# Patient Record
Sex: FEMALE | Race: BLACK OR AFRICAN AMERICAN | NOT HISPANIC OR LATINO | URBAN - METROPOLITAN AREA
[De-identification: names, ages, dates, MRNs, and addresses within clinical notes are randomized per-mention and may not be internally consistent; named-entity substitution may affect disease eponyms.]

---

## 2019-11-19 ENCOUNTER — HISTORICAL (OUTPATIENT)
Dept: ADMINISTRATIVE | Facility: HOSPITAL | Age: 69
End: 2019-11-19

## 2021-06-18 ENCOUNTER — HISTORICAL (OUTPATIENT)
Dept: ENDOSCOPY | Facility: HOSPITAL | Age: 71
End: 2021-06-18

## 2022-04-30 NOTE — OP NOTE
Patient:   Penny Sanabria            MRN: [id number]            FIN: [id number]               Age:   71 years     Sex:  Female     :  1950   Associated Diagnoses:   None   Author:   Colt Paniagua MD      Operative Note   Operative Information   Date/ Time:  2021 09:15:00.     Procedures Performed: Colonoscopy.     Preoperative Diagnosis: History of constipation.     Postoperative Diagnosis: Internal hemorrhoids grade 1-2.  Otherwise normal exam..     Surgeon: Colt Paniagua MD.     Assistant: GI staff.     Anesthesia: per anaesthesia service.     Speciman Removed: None.     Esimated blood loss: No blood loss.     Description of Procedure/Findings/    Complications: History and physical as per pre-operative note. Informed consent was obtained. Patient was placed in left lateral position. Sedation per anaesthesia service. Rectal exam was unremarkable. Olympus video colonoscope was introduced into the rectum and was carefully advanced to the cecum. The quality of the preparation was satisfactory. Patient tolerated the procedure well without any complications..     Findings: Cecum, ascending colon, transverse colon, descending colon, sigmoid colon and rectum appeared unremarkable except grade 1-2 internal hemorrhoids noted on withdrawal of the scope and retroflexion of the scope in the rectum.  Estimated withdrawal time from cecum to rectum was 9 minutes and 8 seconds...     Complications: None.     Recommendations:  1.  Diet as tolerated.  2.  Repeat colonoscopy in 10 years and medical status permits..

## 2024-01-04 DIAGNOSIS — R94.121 VESTIBULAR FUNCTION STUDY ABNORMALITY: Primary | ICD-10-CM

## 2024-01-09 ENCOUNTER — HOSPITAL ENCOUNTER (OUTPATIENT)
Dept: RADIOLOGY | Facility: HOSPITAL | Age: 74
Discharge: HOME | End: 2024-01-09
Attending: OTOLARYNGOLOGY
Payer: MEDICARE

## 2024-01-09 DIAGNOSIS — R94.121 VESTIBULAR FUNCTION STUDY ABNORMALITY: ICD-10-CM

## 2024-01-09 PROCEDURE — 25500020 PHARM REV CODE 255: Performed by: OTOLARYNGOLOGY

## 2024-01-09 PROCEDURE — A9577 INJ MULTIHANCE: HCPCS | Performed by: OTOLARYNGOLOGY

## 2024-01-09 PROCEDURE — 70553 MRI BRAIN STEM W/O & W/DYE: CPT | Mod: TC

## 2024-01-09 RX ADMIN — GADOBENATE DIMEGLUMINE 10 ML: 529 INJECTION, SOLUTION INTRAVENOUS at 02:01
